# Patient Record
(demographics unavailable — no encounter records)

---

## 2024-10-16 NOTE — DISCUSSION/SUMMARY
[FreeTextEntry1] : We discussed management options for overactive bladder including observation, behavioral modifications and bladder training, physical therapy and medications including anticholinergics and beta 3 agonists. We discussed additional treatment options including PTNS as patient notes she and her  would prefer to start more noninvasive and natural treatment options. [facility] handout on PTNS given to patient. She would like to start with trying the myrbetriq 25mg. We discussed behavioral modifications as well. Risks to myrbetriq discussed. She was counseled to check BP 1-2 weeks after initiating the medication and DC if over 160 and/or 100. She understood and agreed, Rx sent. Instructed to call with any questions or concerns and she verbalizes understanding. Return in 4-6 weeks for med f/u and PVR check or sooner if needed. All questions were answered.

## 2024-10-16 NOTE — HISTORY OF PRESENT ILLNESS
[FreeTextEntry1] : Patient with hx frequency, urgency and UUI presents to office for f/u on myrbetriq 25mg. She was started on this at 09/03/24 visit. She reports she has not started medication as she was worried about other symptoms of low BP when she was taking wegovy and did not start myrbetriq. She notes her urgency and frequency continue and are very bothersome. She also notes she has been unable to start PFPT due to waitlist for painful intercourse and OAB symptoms. PVR and BP normal and she denies any SE.  PVR: 0 BP: 107/71

## 2024-10-16 NOTE — PHYSICAL EXAM
[No Acute Distress] : in no acute distress [Well developed] : well developed [Well Nourished] : ~L well nourished [Good Hygeine] : demonstrates good hygeine [Oriented x3] : oriented to person, place, and time [Normal Memory] : ~T memory was ~L unimpaired [Normal Mood/Affect] : mood and affect are normal [Cough] : no cough [Normal Gait] : gait was normal

## 2024-11-22 NOTE — ASSESSMENT
[FreeTextEntry1] : MARY ANNE GAYLE is here for an appointment for a followup of weight loss medications. Feeling much better with current regimen. Struggling to drink water and unable to intake protein. Falling hair.   BARIATRIC SURGERY HISTORY: OBESITY COMORBIDITIES: none CURRENT ANTI-OBESITY MEDICATIONS: Wegovy 0.5mg OBESITY MEDICATION SIDE EFFECTS: HISTORY OF WEIGHT LOSS MEDICATIONS: Complications & Side Effects of Anti-Obesity Medications: nausea, diarrhea  Goal Weight: Patient Current weight 170-->156-->144-->140 BMI: 31.09-->28.3-->25.79-->25.61   Nutirtion:less portions,   Breakfast: 730am shake, 1 egg w/ strawberries snack: 930am yogurt, strawberries, granola  Lunch:11am- turkey meat, salad, [employer]- chicken/pesto sandwich (1/2 ), pieces of chicken Snack: 330pm Protein bar, leftover lunch Dinner: turkey meat ground, salad, increased eating out than normal  Snacks: chips, occasional fruit Drinks: arnold- bragg every day, stopped coffee ( had to used alot of creamer)   Physical activity- gym, 3x-4x/week Medication compliance- good  Plan of Care: Whole food eating based strategy. Stressed regular schedule protein filled meals w/ snack to decreased hair loss Will decrease back to 0.5mg- q 10days, stressed hydration and electrolytes replacement. Patient will return for nutrotionist for advice/ discipline regarding food.  Return in 4 months.

## 2025-02-14 NOTE — REASON FOR VISIT
[Home] : at home, [unfilled] , at the time of the visit. [Medical Office: ([facility])___] : at the medical office located in  [Telehealth (audio & video)] : This visit was provided via telehealth using real-time 2-way audio visual technology. [Verbal consent obtained from patient] : the patient, [unfilled] [Follow-Up] : a follow-up visit

## 2025-02-14 NOTE — ASSESSMENT
[FreeTextEntry1] : MARY ANNE GAYLE is here for an appointment for a followup of weight loss medications. Feeling much better with current regimen. Struggling to drink water and unable to intake protein. Falling hair.  BARIATRIC SURGERY HISTORY: OBESITY COMORBIDITIES: none CURRENT ANTI-OBESITY MEDICATIONS: Wegovy 0.5mg OBESITY MEDICATION SIDE EFFECTS: HISTORY OF WEIGHT LOSS MEDICATIONS: Complications & Side Effects of Anti-Obesity Medications: nausea, diarrhea  Goal Weight: Patient Current weight 170-->156-->144-->140-->138 BMI: 31.09-->28.3-->25.79-->25.61-->25.24   Nutirtion:less portions, unchanged  Breakfast: 730am shake, 1 egg w/ strawberries snack: 930am yogurt, strawberries, granola Lunch:11am- turkey meat, salad, [employer]- chicken/pesto sandwich (1/2 ), pieces of chicken Snack: 330pm Protein bar, leftover lunch Dinner: turkey meat ground, salad, increased eating out than normal Snacks: chips, occasional fruit Drinks: patricia bragg every day,  Physical activity- gym, 3x-4x/week Medication compliance- good  Plan of Care: Hair loss slowed down, with protein and supplementation.  Whole food eating based strategy. Stressed regular schedule protein filled meals w/ snack  Will decrease to 0.25mg at this time for maintainence Patient will return for nutritionist for advice/ discipline regarding food. Return in 3 months.

## 2025-05-16 NOTE — REASON FOR VISIT
[Home] : at home, [unfilled] , at the time of the visit. [Medical Office: ([facility])___] : at the medical office located in  [Telehealth (audio & video)] : This visit was provided via telehealth using real-time 2-way audio visual technology. [Verbal consent obtained from patient] : the patient, [unfilled] [Follow-Up] : a follow-up visit

## 2025-05-16 NOTE — ASSESSMENT
[FreeTextEntry1] : MARY ANNE GAYLE is here for an appointment for a followup of guido loss medications.  Still doing well on Wegov 0.25mg. Irregular menses. Patient is still trying to conceive with Dr. Frederick,   BARIATRIC SURGERY HISTORY: OBESITY COMORBIDITIES: none CURRENT ANTI-OBESITY MEDICATIONS: Wegovy 0.25mg OBESITY MEDICATION SIDE EFFECTS: HISTORY OF WEIGHT LOSS MEDICATIONS: Complications & Side Effects of Anti-Obesity Medications: nausea,   Goal Weight: Patient Current weight 170-->156-->144-->140-->138-->130 BMI: 31.09-->28.3-->25.79-->25.61-->25.24-->23.78   Nutirtion:less portions, unchanged  Breakfast: 730am shake, 1 egg w/ strawberries snack: 930am yogurt, strawberries, granola Lunch:11am- turkey meat, salad, [employer]- chicken/pesto sandwich (1/2 ), pieces of chicken Snack: 330pm Protein bar, leftover lunch Dinner: turkey meat ground, salad, increased eating out than normal Snacks: chips, occasional fruit Drinks: patricia bragg every day,  Physical activity- gym, 3x-4x/week Medication compliance- good  Plan of Care: Whole food eating based strategy. Stressed regular schedule protein filled meals w/ snack  Will decrease to 0.25mg at this time for maintainence  Irregular menses Plan for well woman visit/ US and lab work Patient still interested in conception, will refer back to [facility].

## 2025-06-04 NOTE — PLAN
[FreeTextEntry1] : Well woman visit completed Mammo Rx given to patient  Irregular menses US reviewed, small fibroids noted, w/ complex corpus luteum on right Labs drawn Discussed with patient to f/u with [facility] if interested in conception,  Labial Lump Previous US reviewed with patient Will not intervene at this time

## 2025-06-04 NOTE — PHYSICAL EXAM
[Chaperoned Physical Exam] : A chaperone was present in the examining room during all aspects of the physical examination. [MA] : MA [Appropriately responsive] : appropriately responsive [Alert] : alert [No Acute Distress] : no acute distress [Soft] : soft [Non-tender] : non-tender [Non-distended] : non-distended [No HSM] : No HSM [No Lesions] : no lesions [No Mass] : no mass [Oriented x3] : oriented x3 [Examination Of The Breasts] : a normal appearance [No Masses] : no breast masses were palpable [Vulvar Atrophy] : vulvar atrophy [Labia Majora] : normal [Labia Minora] : normal [Atrophy] : atrophy [Normal] : normal [Uterine Adnexae] : normal [Declined] : Patient declined rectal exam [FreeTextEntry2] : 1 cmn labia lump noted on left side [FreeTextEntry8] : negative

## 2025-06-04 NOTE — HISTORY OF PRESENT ILLNESS
[Patient reported colonoscopy was normal] : Patient reported colonoscopy was normal [Y] : Patient is sexually active [N] : Patient denies prior pregnancies [Menarche Age: ____] : age at menarche was [unfilled] [No] : Patient does not have concerns regarding sex [Currently Active] : currently active [Ultrasound] : ultrasound [TextBox_4] : well woman visit  c/o irregular menstrual bleeding,  Hair loss Interested in pursuing fertility options  [Mammogramdate] : 08/29/24 [TextBox_19] : BR 1 [BreastSonogramDate] : 08/29/24 [TextBox_25] : BR 2 [PapSmeardate] : 07/19/23 [TextBox_31] : NEG [ColonoscopyDate] : 01/20/25 [GonorrheaDate] : 05/28/20 [TextBox_63] : NEG [ChlamydiaDate] : 05/28/20 [TextBox_68] : NEG [HPVDate] : 07/19/23 [TextBox_78] : NEG [TextBox_102] : irregular [LMPDate] : 05/14/25 [FreeTextEntry1] : 05/14/25

## 2025-06-04 NOTE — COUNSELING
[Breast Self Exam] : breast self exam [Bladder Hygiene] : bladder hygiene [Contraception/ Emergency Contraception/ Safe Sexual Practices] : contraception, emergency contraception, safe sexual practices [Confidentiality] : confidentiality